# Patient Record
Sex: MALE | Race: WHITE | NOT HISPANIC OR LATINO | URBAN - NONMETROPOLITAN AREA
[De-identification: names, ages, dates, MRNs, and addresses within clinical notes are randomized per-mention and may not be internally consistent; named-entity substitution may affect disease eponyms.]

---

## 2017-05-26 PROBLEM — H35.3131: Noted: 2019-01-14

## 2017-05-26 PROBLEM — H16.223: Noted: 2019-01-14

## 2017-05-26 PROBLEM — E11.9: Noted: 2017-05-26

## 2017-05-26 PROBLEM — Z96.1: Noted: 2019-01-14

## 2019-01-14 ENCOUNTER — IMPORTED ENCOUNTER (OUTPATIENT)
Dept: URBAN - NONMETROPOLITAN AREA CLINIC 1 | Facility: CLINIC | Age: 79
End: 2019-01-14

## 2019-01-14 PROCEDURE — 92014 COMPRE OPH EXAM EST PT 1/>: CPT

## 2019-01-14 NOTE — PATIENT DISCUSSION
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 Marianela Adorno-  reviewed the importance of good blood sugar control and the negative effects of poorly controlled sugars on ocular health-  monitor yearly or prnMild ARMD OU-  discussed findings w/aptient-  start PreserVision AREDS 2 PO BID samples given-  continue without Amsler Grid at this time-  monitor 6 months w/ OCT macPseudophakia OU -  discussed findings w/patient-  both intraocular lenses are stable and in position-  Open Caps OU s/p Yag PC OU 2015Dry Eye Syndrome -  discussed findings w/patient-  Continue Restasis OU BID.-  Continue Gel drops/tears alternating Q2hrs.-  Contiue Lotemax OU BID.-  Continue Gel OU QHS. -  monitor prn; 's Notes: MRDFE 1/14/2019MAC OCT - 6/7/18

## 2019-07-16 ENCOUNTER — IMPORTED ENCOUNTER (OUTPATIENT)
Dept: URBAN - NONMETROPOLITAN AREA CLINIC 1 | Facility: CLINIC | Age: 79
End: 2019-07-16

## 2019-07-16 PROBLEM — E11.9: Noted: 2019-07-16

## 2019-07-16 PROBLEM — H35.3131: Noted: 2019-07-16

## 2019-07-16 PROBLEM — H16.223: Noted: 2019-01-14

## 2019-07-16 PROBLEM — Z96.1: Noted: 2019-01-14

## 2019-07-16 PROCEDURE — 92134 CPTRZ OPH DX IMG PST SGM RTA: CPT

## 2019-07-16 PROCEDURE — 92014 COMPRE OPH EXAM EST PT 1/>: CPT

## 2019-07-16 PROCEDURE — 92015 DETERMINE REFRACTIVE STATE: CPT

## 2019-07-16 NOTE — PATIENT DISCUSSION
Mild ARMD OU-  discussed findings w/aptient-  continue PreserVision AREDS 2 PO BID-  OCT Mac done today mild drusen and RPE changes OU-  continue without Amsler Grid at this time-  monitor 6 months w/Fundus PhotosType II DM w/o Retinopathy -  discussed findings w/patient-  condition controlled with diet-  no retinopathy seen today-  will send notes from today to Dr. Christina Gonzalez-  reviewed the importance of good blood sugar control and the negative effects of poorly controlled sugars on ocular health-  monitor yearly or prnPseudophakia OU -  discussed findings w/patient-  s/p YAG PC OU open capsules noted-  monitor yearly or prn Dry Eye Syndrome -  discussed findings w/patient-  Continue Restasis OU BID.-  Continue Gel drops/tears alternating Q2hrs.-  Contiue Lotemax OU BID.-  Continue Gel OU QHS. -  monitor prn; 's Notes: MR 7/16/2019DFE 7/16/2019MAC OCT 7/16/2019

## 2020-01-23 ENCOUNTER — IMPORTED ENCOUNTER (OUTPATIENT)
Dept: URBAN - NONMETROPOLITAN AREA CLINIC 1 | Facility: CLINIC | Age: 80
End: 2020-01-23

## 2020-01-23 PROCEDURE — 92014 COMPRE OPH EXAM EST PT 1/>: CPT

## 2020-01-23 PROCEDURE — 92250 FUNDUS PHOTOGRAPHY W/I&R: CPT

## 2020-01-23 NOTE — PATIENT DISCUSSION
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 Wood Stoll-  reviewed the importance of good blood sugar control and the negative effects of poorly controlled sugars on ocular health-  monitor yearly or prnPseudophakia OU -  discussed findings w/patient-  s/p YAG PC OU open capsules noted-  monitor yearly or prn Dry Eye Syndrome -  discussed findings w/patient-  Continue Restasis OU BID.-  Continue Gel drops/tears alternating Q2hrs.-  Contiue Lotemax OU BID.-  Continue Gel OU QHS. -  monitor prn; 's Notes: MR 7/16/2019DFE 1/23/2020MAC OCT 7/16/2019Fundus Photos 1/23/2020

## 2020-02-12 NOTE — PATIENT DISCUSSION
The patient was informed that with [address] [facility] for distance, they will need glasses for all near and intermediate activities after surgery. The patient understands there is a possibility they may need an enhancement after surgery. The patient elects [employer] OD, goal of emmetropia.

## 2020-03-03 NOTE — PATIENT DISCUSSION
The patient was informed that with [address] [facility] for distance, they will need glasses for all near and intermediate activities after surgery. The patient understands there is a possibility they may need an enhancement after surgery. The patient elects [employer] OD, goal of emmetropia.

## 2020-07-07 ENCOUNTER — IMPORTED ENCOUNTER (OUTPATIENT)
Dept: URBAN - NONMETROPOLITAN AREA CLINIC 1 | Facility: CLINIC | Age: 80
End: 2020-07-07

## 2020-07-07 PROBLEM — H35.3131: Noted: 2019-07-16

## 2020-07-07 PROBLEM — H16.223: Noted: 2020-07-07

## 2020-07-07 PROBLEM — H35.373: Noted: 2020-07-07

## 2020-07-07 PROBLEM — Z96.1: Noted: 2020-07-07

## 2020-07-07 PROBLEM — E11.9: Noted: 2020-07-07

## 2020-07-07 PROCEDURE — 92134 CPTRZ OPH DX IMG PST SGM RTA: CPT

## 2020-07-07 PROCEDURE — 92015 DETERMINE REFRACTIVE STATE: CPT

## 2020-07-07 PROCEDURE — 92014 COMPRE OPH EXAM EST PT 1/>: CPT

## 2020-07-07 NOTE — PATIENT DISCUSSION
Mild ARMD OU/ERM OU-  discussed findings w/aptient-  continue PreserVision AREDS 2 PO BID-  OCT Mac done 7/7/2020 mild drusen and RPE changes OU mild ERM OU-  Fundus Photos done 1/23/2020 drusen/RPE changes noted OD & OS-  continue without Amsler Grid at this time-  monitor 6 months f/u w/Fundus PhotosType II DM w/o Retinopathy -  discussed findings w/patient-  condition controlled with diet-  no retinopathy seen today stable-  will send notes from today to Dr. Brand Neighbor-  reviewed the importance of good blood sugar control and the negative effects of poorly controlled sugars on ocular health-  monitor yearly or prnPseudophakia OU -  discussed findings w/patient-  s/p YAG PC OU open capsules noted-  monitor as scheduled or prnDry Eye Syndrome -  discussed findings w/patient-  Continue Restasis OU BID.-  Continue tears PRN OU-  Contiue Lotemax PRN OU-  Continue Gel OU QHS-  monitor q6mo or prn; 's Notes: MR 7/7/2020DFE 7/7/2020OCT Mac 7/7/2020Fundus Photos 1/23/2020

## 2021-01-07 ENCOUNTER — IMPORTED ENCOUNTER (OUTPATIENT)
Dept: URBAN - NONMETROPOLITAN AREA CLINIC 1 | Facility: CLINIC | Age: 81
End: 2021-01-07

## 2021-01-07 PROCEDURE — 92250 FUNDUS PHOTOGRAPHY W/I&R: CPT

## 2021-01-07 PROCEDURE — 92014 COMPRE OPH EXAM EST PT 1/>: CPT

## 2021-01-07 NOTE — PATIENT DISCUSSION
Mild ARMD OU/ERM OU-  discussed findings w/aptient-  continue PreserVision AREDS 2 PO BID-  OCT Mac done 7/7/2020    OD:mild hard drusen RPE changes mild ERM    OS: mild hard drusen mild RPE changes mild ERM-  Fundus Photos done 1/7/2021    OD: mild hard drusen mild RPE changes stable    OS: mild hard drusen mild RPE changes stable-  continue without Amsler Grid at this time-  monitor 6 months f/u w/Fundus PhotosType II DM w/o Retinopathy -  discussed findings w/patient-  condition controlled with diet-  no retinopathy seen today stable-  will send notes from today to Dr. Loreto Quintanilla-  reviewed the importance of good blood sugar control and the negative effects of poorly controlled sugars on ocular health-  monitor yearly or prnPseudophakia OU -  discussed findings w/patient-  s/p YAG PC OU open capsules noted-  monitor as scheduled or prnDry Eye Syndrome -  discussed findings w/patient-  Continue Restasis OU BID.-  Continue tears PRN OU-  Contiue Lotemax PRN OU-  Continue Gel OU QHS-  monitor q6mo or prn; 's Notes: MR 7/7/2020DFE 1/7/2021OCT Mac 7/7/2020Fundus Photos 1/7/2021

## 2021-07-09 ENCOUNTER — IMPORTED ENCOUNTER (OUTPATIENT)
Dept: URBAN - NONMETROPOLITAN AREA CLINIC 1 | Facility: CLINIC | Age: 81
End: 2021-07-09

## 2021-07-09 PROCEDURE — 92134 CPTRZ OPH DX IMG PST SGM RTA: CPT

## 2021-07-09 PROCEDURE — 92014 COMPRE OPH EXAM EST PT 1/>: CPT

## 2021-07-09 PROCEDURE — 92015 DETERMINE REFRACTIVE STATE: CPT

## 2021-07-09 NOTE — PATIENT DISCUSSION
Mild ARMD OU/ERM OU-  discussed findings w/aptient-  continue PreserVision AREDS 2 PO BID-  OCT Mac done 7/7/2020    OD:mild hard drusen RPE changes mild ERM    OS: mild hard drusen mild RPE changes mild ERM-  Fundus Photos done 1/7/2021    OD: mild hard drusen mild RPE changes stable    OS: mild hard drusen mild RPE changes stable-  continue without Amsler Grid at this time-  monitor 6 months f/u w/Fundus PhotosType II DM w/o Retinopathy -  discussed findings w/patient-  condition controlled with diet-  no retinopathy seen today stable-  will send notes from today to Dr. Tha Felton-  reviewed the importance of good blood sugar control and the negative effects of poorly controlled sugars on ocular health-  monitor yearly or prnPseudophakia OU -  discussed findings w/patient-  s/p YAG PC OU open capsules noted-  monitor as scheduled or prnDry Eye Syndrome -  discussed findings w/patient-  Continue Restasis OU BID.-  Continue tears PRN OU-  Contiue Lotemax PRN OU-  Continue Gel OU QHS-  monitor q6mo or prn; 's Notes: MR 7/9/2021DFE 7/9/2021OCT Mac 7/9/2021Fundus Photos 1/7/2021

## 2021-08-19 NOTE — PATIENT DISCUSSION
Pt is wearing PALs made at [facility] a few years ago for reading after surgery.

## 2021-08-19 NOTE — PATIENT DISCUSSION
Recommend 3cc's of Juvederm Ultra Plus (one to lips and 2 to oral commissures) (discussed risks and benefits. .. ).

## 2021-08-19 NOTE — PATIENT DISCUSSION
Recommend Full face CO2 laser heavier to liz-oral area with derma sanding (discussed risks and benefits of sx. .. ).

## 2021-08-19 NOTE — PATIENT DISCUSSION
Recommend Bilateral upper lid blepharoplasty with YENIFER tuck stitch. Predeterm (discussed risks and benefits of sx. ..). Discussed that FAT is an extra cosmetic cost and not covered by ins.

## 2021-08-20 NOTE — PATIENT DISCUSSION
Recommend Bilateral upper lid blepharoplasty. Predeterm (discussed risks and benefits of sx. ..). Discussed that FAT is an extra cosmetic cost and not covered by ins.

## 2022-01-24 NOTE — PATIENT DISCUSSION
Patient informed of available non-opioid medications and other non-pharmacological options. Discussed advantages and disadvantages of the alternatives, including whether the patient is at-risk for, or has a history of, controlled substance abuse or misuse, and the patient’s personal preferences. [facility] “Opioid Alternative Pamphlet” was provided to patient.

## 2022-01-24 NOTE — PATIENT DISCUSSION
Patient informed of available non-opioid medications and other non-pharmacological options. Discussed advantages and disadvantages of the alternatives, including whether the patient is at-risk for, or has a history of, controlled substance abuse or misuse, and the patient’s personal preferences. [facility] “Opioid Alternative Pamphlet” was provided to patient.

## 2022-01-24 NOTE — PATIENT DISCUSSION
Pt is wearing PALs made at [facility] a few years ago for reading after surgery.

## 2022-01-25 NOTE — PATIENT DISCUSSION
Pt is wearing PALs made at [facility] a few years ago for reading after surgery.

## 2022-01-31 NOTE — PATIENT DISCUSSION
Healing well, no infection and no sores, honey crusting around mouth. Pt states she ran out of [place]. Recommend using Soothe TID with Mupirocin BID(erx sent to pharmacy 1-31-22) around mouth.

## 2022-01-31 NOTE — PATIENT DISCUSSION
One week post op: great curve and shape, no infection, healing well, sutures intact and removed, use erythromycin QHS to upper eyelids. Wear sunglasses and hat while outdoors. Avoid sun exposure.

## 2022-01-31 NOTE — PATIENT DISCUSSION
One week post op: lids in good position, no infection, healing well, use erythromycin QHS to lower eyelids and Tobradex QHS x 7-10 days. Wear sunglasses and hat while outdoors. Avoid sun exposure.

## 2022-02-10 NOTE — PATIENT DISCUSSION
post op: great curve and shape, no infection, healing well Wear sunglasses and hat while outdoors. Avoid sun exposure.

## 2022-02-10 NOTE — PATIENT DISCUSSION
Healing well, no infection and no sores. Pt states she ran out of Nectar. still using Soothe on occasion if needed and started restorative by Frankie.

## 2022-03-18 NOTE — PATIENT DISCUSSION
Pt is wearing PALs made at [facility] a few years ago for reading after surgery.

## 2022-04-09 ASSESSMENT — VISUAL ACUITY
OD_SC: 20/25-2
OS_PH: 20/25
OS_SC: 20/30-
OD_SC: 20/20-2
OS_SC: 20/30+2
OD_SC: 20/25+1
OS_SC: 20/30-2 BLURRY
OU_SC: 20/25
OD_SC: 20/25-2 BLURRY
OS_SC: 20/40
OU_CC: J1
OU_CC: 20/20
OS_SC: 20/40
OU_CC: 20/20
OD_SC: 20/30+
OU_SC: 20/25
OS_SC: 20/25 BLURRY
OD_SC: 20/25
OU_CC: 20/25

## 2022-04-09 ASSESSMENT — TONOMETRY
OS_IOP_MMHG: 14
OS_IOP_MMHG: 16
OD_IOP_MMHG: 16
OS_IOP_MMHG: 14
OD_IOP_MMHG: 15
OS_IOP_MMHG: 13
OD_IOP_MMHG: 12
OS_IOP_MMHG: 16
OD_IOP_MMHG: 11
OD_IOP_MMHG: 14
OS_IOP_MMHG: 11
OD_IOP_MMHG: 15

## 2025-04-28 NOTE — PATIENT DISCUSSION
The patient was informed that with [address] [facility] for distance, they will need glasses for all near and intermediate activities after surgery. The patient understands there is a possibility they may need an enhancement after surgery. The patient elects [employer] OD, goal of emmetropia. Tran Deng was contacted by Ro Sahu MA, a Community Health Navigator, regarding a Social Determinants of Health referral.     A message was left with the writer's contact information.    Follow-up attempt.Writer placed phone call to patient to followup regarding needing assistance with housing.